# Patient Record
Sex: MALE | Race: WHITE | Employment: FULL TIME | URBAN - METROPOLITAN AREA
[De-identification: names, ages, dates, MRNs, and addresses within clinical notes are randomized per-mention and may not be internally consistent; named-entity substitution may affect disease eponyms.]

---

## 2017-01-03 ENCOUNTER — TELEPHONE (OUTPATIENT)
Dept: ORTHOPEDIC SURGERY | Age: 37
End: 2017-01-03

## 2020-02-19 ENCOUNTER — OFFICE VISIT (OUTPATIENT)
Dept: ORTHOPEDIC SURGERY | Age: 40
End: 2020-02-19
Payer: COMMERCIAL

## 2020-02-19 VITALS
SYSTOLIC BLOOD PRESSURE: 139 MMHG | HEART RATE: 70 BPM | DIASTOLIC BLOOD PRESSURE: 89 MMHG | HEIGHT: 70 IN | BODY MASS INDEX: 24.05 KG/M2 | WEIGHT: 167.99 LBS

## 2020-02-19 PROCEDURE — G8484 FLU IMMUNIZE NO ADMIN: HCPCS | Performed by: FAMILY MEDICINE

## 2020-02-19 PROCEDURE — 99203 OFFICE O/P NEW LOW 30 MIN: CPT | Performed by: FAMILY MEDICINE

## 2020-02-19 PROCEDURE — G8427 DOCREV CUR MEDS BY ELIG CLIN: HCPCS | Performed by: FAMILY MEDICINE

## 2020-02-19 PROCEDURE — G8420 CALC BMI NORM PARAMETERS: HCPCS | Performed by: FAMILY MEDICINE

## 2020-02-19 PROCEDURE — 1036F TOBACCO NON-USER: CPT | Performed by: FAMILY MEDICINE

## 2020-02-19 RX ORDER — METHYLPREDNISOLONE 4 MG/1
TABLET ORAL
Qty: 21 KIT | Refills: 0 | Status: SHIPPED | OUTPATIENT
Start: 2020-02-19

## 2020-02-19 RX ORDER — CELECOXIB 200 MG/1
200 CAPSULE ORAL DAILY
Qty: 30 CAPSULE | Refills: 3 | Status: SHIPPED | OUTPATIENT
Start: 2020-02-19

## 2020-02-19 NOTE — PROGRESS NOTES
Chief Complaint  Foot Pain (OPNP RIGHT FOOT)      Stone Herrera is a 37 y.o. 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 Jered Johnson who is being seen today upon self-referral for evaluation of ongoing pain to his right forefoot after running a 5K on July 4, 2019. History of Present Illness for New Patient:     Patient is being seen today for chronic right foot pain. The patient reports on 7/4/2019 after running a 5K race he began noticed the onset of pain to primarily the medial and central plantar portion of his forefoot. He does not recall a specific history of actual injury no activity prior to becoming symptomatic noted With swelling numbness or tingling. He did undergo some relative rest and actually in October 2019 did get a pair of custom orthotics fashioned which he did have adjusted only 1 or 2 times but is still causing pain into his forefoot is a try to incorporate a rather sizable plantar metatarsal bar it looks like on a picture but the orthotics are not available for review. He does utilize this with running but they are too uncomfortable for him to wear around the house and he has been using a self modified off-the-shelf insert. He does have minimal discomfort at rest but if he tries to walk barefoot his pain can be a 7-8 out of 10 to the forefoot. He is localized the majority of his pain at this point to the plantar surface of the first and second MTP joints. He has not noticed swelling or bruising. Ranjeet Higgins He describes the symptoms as aching and sharp. The pain is located plantar forefoot more medially and the patient rates their current pain as a 8 out of 10 on the pain scale. This problem has been an issue for 7 months. The problem is worse in the afternoon, in the evening and After running and walking barefoot or is constant or is associated with substantial stiffness and weakness.  Patient has other associated symptoms: Icing and relative rest. Patient has attempted rest, ice, heat, chiropractic therapy/manipulation, inserts over the last several months to treat this problem and symptoms are show no change. Patient does not have a previous history foot pain although he is an over pronator practically on the right. Patient is being seen today for orthopedic and sports consultation and review of imaging. I have reviewed and attest the documentation of the HPI documented by my . I will make any changes if necessary. Enc Date: 2020  Time: 10:37 AM  Provider: Mylene Rodney MD        Review of Systems  Pertinent items are noted in HPI  Review of systems reviewed from Patient History Form dated on 2020 and available in the patient's chart under the Media tab. Vital Signs     There were no vitals taken for this visit. Past Medical History   has no past medical history on file. Past Surgical History   has a past surgical history that includes Nasal septum surgery and Nose surgery (Bilateral, 2013). Social History     Tobacco Use    Smoking status: Former Smoker     Last attempt to quit: 2010     Years since quittin.2   Substance Use Topics    Alcohol use: Yes     Alcohol/week: 3.0 standard drinks     Types: 3 Cans of beer per week    Drug use: Not on file     Comment: no        Current Outpatient Medications   Medication Sig Dispense Refill    Pediatric Multivitamins-Fl (MULTI VIT/FL PO) Take 500 mg by mouth      Omega-3 Fatty Acids (FISH OIL) 1000 MG CAPS Take 3,000 mg by mouth 3 times daily      ketoconazole (NIZORAL) 2 % cream Apply 2 % topically daily Apply topically daily.  nystatin (MYCOSTATIN) 392583 UNIT/GM cream Apply 30 mg topically 2 times daily Apply topically 2 times daily.  meloxicam (MOBIC) 15 MG tablet TK 1 T PO QD  3     No current facility-administered medications for this visit.         General Exam:   Constitutional: Patient is adequately groomed with no evidence of malnutrition  DTRs: Deep tendon reflexes are intact  Mental Status: The patient is oriented to time, place and person. The patient's mood and affect are appropriate. Lymphatic: The lymphatic examination bilaterally reveals all areas to be without enlargement or induration. Vascular: Examination reveals no swelling or calf tenderness. Peripheral pulses are palpable and 2+. Neurological: The patient has good coordination. There is no weakness or sensory deficit. Right foot examination    Inspection: No high-grade deformity or soft tissue swelling. No palpable masses. Palpation: He does seem to have some tenderness over the plantar aspect of the first and second and to lesser degree third MTP joints. I do not feel masses between the metatarsal heads to suggest neuroma. He is tender over the fibular greater than tibial sesamoid. Rang of Motion: Tightness to Achilles and stiffness to the forefoot. Strength: Strength foot and ankle appears to be intact. Special Tests: There is no evidence of instability. Pain with first MTP grind and palpation of the metatarsals as well as sesamoids as listed above. Skin: There are no rashes, ulcerations or lesions. Distal motor sensory and vascular exam is intact. Gait: Fluid smooth gait       Reflex symmetrically preserved      Additional Comments:             Additional Examinations:     Contralateral Exam: Contralateral left foot and ankle exam appears unremarkable. Right Lower Extremity: Examination of the right lower extremity does not show any tenderness, deformity or injury. Range of motion is unremarkable. There is no gross instability. There are no rashes, ulcerations or lesions. Strength and tone are normal.  Left Lower Extremity: Examination of the left lower extremity does not show any tenderness, deformity or injury. Range of motion is unremarkable. There is no gross instability.   There are no rashes, ulcerations or lesions. Strength and tone are normal.        Diagnostic Test Findings: Xr Foot Right (min 3 Views)    Result Date: 2/19/2020  Radiology exam is complete. No Radiologist dictation. Please follow up with ordering provider. Right foot AP lateral oblique films were obtained today and show very minimal very early degenerative change at the first MTP joint with slight hallux valgus. No obvious signs of stress injury. Assessment & Plan:    Encounter Diagnosis   Name Primary?  Right foot pain Yes       Orders Placed This Encounter   Procedures    XR FOOT RIGHT (MIN 3 VIEWS)     Standing Status:   Future     Number of Occurrences:   1     Standing Expiration Date:   2/19/2021         Treatment Plan:  Treatment options were discussed withGerry Carson. We did review his plain films and exam findings. He is a recreational runner running 5 days/week and has continued to run since the onset of his symptoms last July 2019. Suspect we are dealing with metatarsalgia as well as some superimposed sesamoiditis and he is tight with regard to his Achilles. We discussed further work-up with imaging however he has had little in the way of dedicated treatment and therefore we did place him on a Medrol Dosepak to be followed by Celebrex 200 mg daily. He does have a substantial history of reflux and is unable to tolerate Naprosyn ibuprofen and even tried meloxicam in the past with worsening reflux. We did recommend that he returns to his orthotist that n.p.o. in [place] for readjustment of his custom orthotics. His tarsal bar may not be adequately unloading his forefoot and need to be adjusted. I do think he would benefit from supervised therapy as well but more importantly to undergo a formal running analysis as he is also been developing some left-sided mechanical back pain symptoms.   We will have him see Cheron Falling for his back which is also been problematic for the past 6 to 7 months. It sounds as if he may have some very episodic pain into his left leg in the L5 distribution. We will see him back specifically for his foot in about 3 to 4 weeks and hopefully can get his orthotics adjusted in the meantime. He will contact us in the interim with questions or concerns. This dictation was performed with a verbal recognition program (DRAGON) and it was checked for errors. It is possible that there are still dictated errors within this office note. If so, please bring any errors to my attention for an addendum. All efforts were made to ensure that this office note is accurate.

## 2020-02-25 ENCOUNTER — HOSPITAL ENCOUNTER (OUTPATIENT)
Dept: PHYSICAL THERAPY | Age: 40
Setting detail: THERAPIES SERIES
Discharge: HOME | End: 2020-02-25
Payer: COMMERCIAL

## 2020-02-25 PROCEDURE — 97140 MANUAL THERAPY 1/> REGIONS: CPT | Performed by: PHYSICAL THERAPIST

## 2020-02-25 PROCEDURE — 97162 PT EVAL MOD COMPLEX 30 MIN: CPT | Performed by: PHYSICAL THERAPIST

## 2020-02-25 PROCEDURE — 97110 THERAPEUTIC EXERCISES: CPT | Performed by: PHYSICAL THERAPIST

## 2020-02-25 NOTE — PLAN OF CARE
[facility], [address]  Phone: [phone]  Fax [phone]     Physical Therapy Certification    Dear Referring Practitioner: Mary Yu,    We had the pleasure of evaluating the following patient for physical therapy services at [address] [place] [address]. A summary of our findings can be found in the initial assessment below. This includes our plan of care. If you have any questions or concerns regarding these findings, please do not hesitate to contact me at the office phone number checked above. Thank you for the referral.       Physician Signature:_______________________________Date:__________________  By signing above (or electronic signature), therapists plan is approved by physician    Patient: Jacquelyn Escalante   : 1980   MRN: [id number]  Referring Physician: Referring Practitioner: Mary Yu      Evaluation Date: 2020      Medical Diagnosis Information:  Diagnosis: M77.41 (ICD-10-CM) - Metatarsalgia of right foot, M25.80 (ICD-10-CM) - Sesamoiditis, M79.671 (ICD-10-CM) - Right foot pain   Treatment Diagnosis: M79.671 (ICD-10-CM) - Right foot pain                                         Insurance information: PT Insurance Information:  [facility]  - [id number]       Precautions/ Contra-indications: none  Latex Allergy:  [x]NO      []YES  Preferred Language for Healthcare:   [x]English       []other:    SUBJECTIVE: Patient stated complaint: Pt reports R foot pain/ LBP since July of this year. Pt reports running with regularity since . He reports running a race in July, and noted pain the next day. He had soreness in back and foot. Sought chiropractic care and custom inserts. He has been running in [address] [facility] for many years. Pt is currently running still but having to modify mileage and intensity. Pt reports that he has had radiating pain from the back to his foot.  Pt reports that he has difficulty sitting for 5-10 mins, relieved in part with standing/walking. Pt notes pain with ascending>descending stairs. F/u with spine MD Monday and f/u with [facility]. Relevant Medical History:sibro psoriasis  Functional Disability Index: 18% LEFS    Height 5'10\" Weight 160  Pain Scale: 5/10 (LBP), 1/10 in foot pain  Easing factors: heat   Provocative factors: sitting, running     Type: []Constant   [x]Intermittent  [x]Radiating []Localized []other:     Numbness/Tingling: L post glute    Occupation/School:     Living Status/Prior Level of Function: Independent with ADLs and IADLs, running 25 miles per week ([employer] 5k, 1/2 marathon March 22). [employer] training mountain biking. OBJECTIVE:     ROM LEFT RIGHT   HIP Flex -Villa -Villa   HIP Abd     HIP Ext     HIP IR 25 25   HIP ER 30 30   Knee ext     Knee Flex     Ankle PF     Ankle DF +10 +10   Ankle In     Ankle Ev     Strength  LEFT RIGHT   HIP Flexors 4 4+   HIP Abductors 3+    HIP Ext Back dom  4 Back dom  4+   Hip ER 3+ 3+   Knee EXT (quad) 5 5   Knee Flex (HS)     Ankle DF 5 5   Ankle PF     Ankle Inv     Ankle EV            Reflexes/Sensation:    []Dermatomes/Myotomes intact    [x]Reflexes equal and normal bilaterally   []Other:    Joint mobility:    []Normal    []Hypo   []Hyper    Palpation: TTP L PSIS    Functional Mobility/Transfers:  Poor squat depth, improved with ER of feet/ hip,  Single leg squat, poor depth, valgus of knee B with L lat lean; balance <10\" EO R, <10\" EC L    Posture: calc inv. B, PF first ray, collapsed arches both feet    Bandages/Dressings/Incisions:     Gait: (include devices/WB status) early overpronation, decreased push off R>L; Orthopedic Special Tests: L LE long, L pubic bone deep, L ASIS ant innominate, L PSIS inf, deep sulcus, L LE hypo    [x] Patient history, allergies, meds reviewed. Medical chart reviewed. See intake form.      Review Of Systems (ROS):  [x]Performed Review of systems []Excellent   [x]Good    []Fair   []Poor  Physical Therapy Evaluation Complexity Justification  [x] A history of present problem with:  [] no personal factors and/or comorbidities that impact the plan of care;  [x]1-2 personal factors and/or comorbidities that impact the plan of care  []3 personal factors and/or comorbidities that impact the plan of care  [x] An examination of body systems using standardized tests and measures addressing any of the following: body structures and functions (impairments), activity limitations, and/or participation restrictions;:  [] a total of 1-2 or more elements   [] a total of 3 or more elements   [x] a total of 4 or more elements   [x] A clinical presentation with:  [] stable and/or uncomplicated characteristics   [x] evolving clinical presentation with changing characteristics  [] unstable and unpredictable characteristics;   [x] Clinical decision making of [] low, [x] moderate, [] high complexity using standardized patient assessment instrument and/or measurable assessment of functional outcome. [] EVAL (LOW) 88879 (typically 20 minutes face-to-face)  [x] EVAL (MOD) 16443 (typically 30 minutes face-to-face)  [] EVAL (HIGH) 57640 (typically 45 minutes face-to-face)  [] RE-EVAL       PLAN:   Frequency/Duration:  1-2 days per week for 8-10 Weeks:  Interventions:  [x]  Therapeutic exercise including: strength training, ROM, for Lower extremity and core   [x]  NMR activation and proprioception for LE, Glutes and Core   [x]  Manual therapy as indicated for LE, Hip and spine to include: Dry Needling/IASTM, STM, PROM, Gr I-IV mobilizations, manipulation. [x] Modalities as needed that may include: thermal agents, E-stim, Biofeedback, US, iontophoresis as indicated  [x] Patient education on joint protection, postural re-education, activity modification, progression of HEP.     HEP instruction: (see scanned forms)    GOALS:  Patient stated goal: return to running pain free  []

## 2020-02-25 NOTE — FLOWSHEET NOTE
L inflare, L ant innominate 10 mins  Symmetrical pelvis achieved   R on R sacral torsion 4 reps  No pain in PSIS after correction                           NMR re-education (67281)   CUES NEEDED   Ockler stab 10\" 10  HEP                                             Pt education: px, dx, POC, role of PT, biomechanical compensations, gait analysis, cross training, strength training, shoe vs orthotic use       Therapeutic Activity (06042)                                                Therapeutic Exercise and NMR EXR  [x] (33991) Provided verbal/tactile cueing for activities related to strengthening, flexibility, endurance, ROM for improvements in LE, proximal hip, and core control with self care, mobility, lifting, ambulation. [x] (98469) Provided verbal/tactile cueing for activities related to improving balance, coordination, kinesthetic sense, posture, motor skill, proprioception  to assist with LE, proximal hip, and core control in self care, mobility, lifting, ambulation and eccentric single leg control.      NMR and Therapeutic Activities:    [x] (27799 or 04137) Provided verbal/tactile cueing for activities related to improving balance, coordination, kinesthetic sense, posture, motor skill, proprioception and motor activation to allow for proper function of core, proximal hip and LE with self care and ADLs  [x] (54623) Gait Re-education- Provided training and instruction to the patient for proper LE, core and proximal hip recruitment and positioning and eccentric body weight control with ambulation re-education including up and down stairs     Home Exercise Program:    [x] (50927) Reviewed/Progressed HEP activities related to strengthening, flexibility, endurance, ROM of core, proximal hip and LE for functional self-care, mobility, lifting and ambulation/stair navigation   [] (94694)Reviewed/Progressed HEP activities related to improving balance, coordination, kinesthetic sense, posture, motor skill, Patient will demonstrate increased AROM to [facility] to allow for proper joint functioning as indicated by patients Functional Deficits. []? Progressing: []? Met: []? Not Met: []? Adjusted  3. Patient will demonstrate an increase in Strength to good proximal hip strength and control, within 5lb HHD in LE to allow for proper functional mobility as indicated by patients Functional Deficits. []? Progressing: []? Met: []? Not Met: []? Adjusted  4. Patient will return to all functional activities without increased symptoms or restriction. []? Progressing: []? Met: []? Not Met: []? Adjusted  5. Pt will be able to sit, stand, walk and ascend/descend stairs without sx's or limitations. (patient specific functional goal)    []? Progressing: []? Met: []? Not Met: []? Adjusted       Progression Towards Functional goals:  [] Patient is progressing as expected towards functional goals listed. [] Progression is slowed due to complexities listed. [] Progression has been slowed due to co-morbidities. [x] Plan just implemented, too soon to assess goals progression  [] Other:         Overall Progression Towards Functional goals/ Treatment Progress Update:  [] Patient is progressing as expected towards functional goals listed. [] Progression is slowed due to complexities/Impairments listed. [] Progression has been slowed due to co-morbidities.   [x] Plan just implemented, too soon to assess goals progression <30days   [] Goals require adjustment due to lack of progress  [] Patient is not progressing as expected and requires additional follow up with physician  [] Other    Prognosis for POC: [x] Good [] Fair  [] Poor      Patient requires continued skilled intervention: [x] Yes  [] No    Treatment/Activity Tolerance:  [x] Patient able to complete treatment  [] Patient limited by fatigue  [] Patient limited by pain    [] Patient limited by other medical complications  [] Other:     ASSESSMENT: see eval    Return to Play: (if

## 2020-02-28 ENCOUNTER — HOSPITAL ENCOUNTER (OUTPATIENT)
Dept: PHYSICAL THERAPY | Age: 40
Setting detail: THERAPIES SERIES
Discharge: HOME | End: 2020-02-28
Payer: COMMERCIAL

## 2020-02-28 PROCEDURE — 97140 MANUAL THERAPY 1/> REGIONS: CPT | Performed by: PHYSICAL THERAPIST

## 2020-02-28 PROCEDURE — 97110 THERAPEUTIC EXERCISES: CPT | Performed by: PHYSICAL THERAPIST

## 2020-02-28 PROCEDURE — 97112 NEUROMUSCULAR REEDUCATION: CPT | Performed by: PHYSICAL THERAPIST

## 2020-02-28 NOTE — FLOWSHEET NOTE
proper LE, core and proximal hip recruitment and positioning and eccentric body weight control with ambulation re-education including up and down stairs     Home Exercise Program:    [x] (84952) Reviewed/Progressed HEP activities related to strengthening, flexibility, endurance, ROM of core, proximal hip and LE for functional self-care, mobility, lifting and ambulation/stair navigation   [] (59558)Reviewed/Progressed HEP activities related to improving balance, coordination, kinesthetic sense, posture, motor skill, proprioception of core, proximal hip and LE for self care, mobility, lifting, and ambulation/stair navigation      Manual Treatments:  PROM / STM / Oscillations-Mobs:  G-I, II, III, IV (PA's, Inf., Post.)  [x] (26355) Provided manual therapy to mobilize LE, proximal hip and/or LS spine soft tissue/joints for the purpose of modulating pain, promoting relaxation,  increasing ROM, reducing/eliminating soft tissue swelling/inflammation/restriction, improving soft tissue extensibility and allowing for proper ROM for normal function with self care, mobility, lifting and ambulation. Modalities:     [] GAME READY (VASO)- for significant edema, swelling, pain control. Charges:  Timed Code Treatment Minutes: 50   Total Treatment Minutes: 60     BWC time in/time out:   (and requires time in and out for each CPT code)    [] EVAL (LOW) 21981 (typically 20 minutes face-to-face)  [x] EVAL (MOD) 74871 (typically 30 minutes face-to-face)  [] EVAL (HIGH) 24088 (typically 45 minutes face-to-face)  [] RE-EVAL     [x] AZ(67005) x   1  [] IONTO  [x] NMR (30244) x  1   [] VASO  [x] Manual (10621) x 1    [] Other:  [] TA x      [] Mech Traction (12351)  [] ES(attended) (02426)      [] ES (un) (99687):       GOALS:  Patient stated goal: return to running pain free  []? Progressing: []? Met: []? Not Met: []? Adjusted     Therapist goals for Patient:   Short Term Goals: To be achieved in: 2 weeks  1.  Independent in HEP and progression per patient tolerance, in order to prevent re-injury. []? Progressing: []? Met: []? Not Met: []? Adjusted  2. Patient will have a decrease in pain to facilitate improvement in movement, function, and ADLs as indicated by Functional Deficits. []? Progressing: []? Met: []? Not Met: []? Adjusted     Long Term Goals: To be achieved in: 10 weeks  1. Disability index score of 9% or less for the LEFS to assist with reaching prior level of function. []? Progressing: []? Met: []? Not Met: []? Adjusted  2. Patient will demonstrate increased AROM to [facility] to allow for proper joint functioning as indicated by patients Functional Deficits. []? Progressing: []? Met: []? Not Met: []? Adjusted  3. Patient will demonstrate an increase in Strength to good proximal hip strength and control, within 5lb HHD in LE to allow for proper functional mobility as indicated by patients Functional Deficits. []? Progressing: []? Met: []? Not Met: []? Adjusted  4. Patient will return to all functional activities without increased symptoms or restriction. []? Progressing: []? Met: []? Not Met: []? Adjusted  5. Pt will be able to sit, stand, walk and ascend/descend stairs without sx's or limitations. (patient specific functional goal)    []? Progressing: []? Met: []? Not Met: []? Adjusted       Progression Towards Functional goals:  [] Patient is progressing as expected towards functional goals listed. [] Progression is slowed due to complexities listed. [] Progression has been slowed due to co-morbidities. [x] Plan just implemented, too soon to assess goals progression  [] Other:         Overall Progression Towards Functional goals/ Treatment Progress Update:  [] Patient is progressing as expected towards functional goals listed. [] Progression is slowed due to complexities/Impairments listed. [] Progression has been slowed due to co-morbidities.   [x] Plan just implemented, too soon to assess goals progression <30days

## 2020-03-02 ENCOUNTER — HOSPITAL ENCOUNTER (OUTPATIENT)
Dept: PHYSICAL THERAPY | Age: 40
Setting detail: THERAPIES SERIES
Discharge: HOME | End: 2020-03-02
Payer: COMMERCIAL

## 2020-03-02 PROCEDURE — 97112 NEUROMUSCULAR REEDUCATION: CPT | Performed by: PHYSICAL THERAPIST

## 2020-03-02 PROCEDURE — 97140 MANUAL THERAPY 1/> REGIONS: CPT | Performed by: PHYSICAL THERAPIST

## 2020-03-02 PROCEDURE — 97110 THERAPEUTIC EXERCISES: CPT | Performed by: PHYSICAL THERAPIST

## 2020-03-02 NOTE — FLOWSHEET NOTE
[facility],  [address] [place]  Phone: [phone]  Fax [phone]    Physical Therapy Treatment Note/ Progress Report:           Date:  3/2/2020    Patient Name:  Mary Gray    :  1980  MRN: [id number]  Restrictions/Precautions:    Medical/Treatment Diagnosis Information:  · Diagnosis: M77.41 (ICD-10-CM) - Metatarsalgia of right foot, M25.80 (ICD-10-CM) - Sesamoiditis, M79.671 (ICD-10-CM) - Right foot pain  · Treatment Diagnosis: M79.671 (ICD-10-CM) - Right foot pain; M53.3 SI joint pain  Insurance/Certification information:  PT Insurance Information:  [facility]  - [id number]  Physician Information:  Referring Practitioner: Paula Garcia  Has the plan of care been signed (Y/N):        []  Yes  [x]  No     Date of Patient follow up with Physician: 3-2 Quincy Higgins)      Is this a Progress Report:     []  Yes  [x]  No        If Yes:  Date Range for reporting period:  Sgarhjsir3-  Ending 3-    Progress report will be due (10 Rx or 30 days whichever is less): see above       Recertification will be due (POC Duration  / 90 days whichever is less): 10 days from IE ()       Visit # Insurance Allowable Requires auth   3 60    []no        []yes:     Functional Scale: 18% LEFS (66/80)     Date assessed:  2020      Therapy Diagnosis/Practice Pattern: L SI pain/ R foot pain; C      Number of Comorbidities:  []0     [x]1-2    []3+    Latex Allergy:  [x]NO      []YES  Preferred Language for Healthcare:   [x]English       []other:      Pain level:  2/10 in LB, 2/10 in foot      SUBJECTIVE: Notes significantly last back pain then IE, and still notes levels are decreasing. More sore, less pain. More soreness in the foot, but he still notes focal pressure in the great toe. OBJECTIVE:    Observation:    Test measurements:  Pelvic alignment symmetrical; L PSIS elevated, more symmetrical in ext.  R on R sacral torsion present but improved comparatively     RESTRICTIONS/PRECAUTIONS: runner (1/2 marathon March)    Exercises/Interventions:     Therapeutic Ex (20327) Sets/sec Reps Notes/CUES   Bike  5 mins                 HF/ gastroc  + soleus mob at wall H5 10 ea +hep   Towel pick ups  20 reps +hep   Doming  +SLS H5 15 +hep                                 Manual Intervention (40147)       Symmetrical pelvis achieved    No pain in PSIS after correction             HG Pro to PF  Intrinsic (s); great toe mob, calc mob, TC joint mob 10 mins           NMR re-education (67425)   CUES NEEDED   Ockler stab 10\" 10  HEP   Prone TA+ glute set 10\" 15 reps After MET correction   Supine TA with ADD H5 15 reps With stabilizer  40-60 mmHG   TA + SLR  10 reps ea With stabilizer  40-60 mmHG                           Pt education: px, dx, POC, role of PT, biomechanical compensations, gait analysis, cross training, strength training, shoe vs orthotic use       Therapeutic Activity (21593)                                                Therapeutic Exercise and NMR EXR  [x] (95536) Provided verbal/tactile cueing for activities related to strengthening, flexibility, endurance, ROM for improvements in LE, proximal hip, and core control with self care, mobility, lifting, ambulation. [x] (97972) Provided verbal/tactile cueing for activities related to improving balance, coordination, kinesthetic sense, posture, motor skill, proprioception  to assist with LE, proximal hip, and core control in self care, mobility, lifting, ambulation and eccentric single leg control.      NMR and Therapeutic Activities:    [x] (54105 or 81174) Provided verbal/tactile cueing for activities related to improving balance, coordination, kinesthetic sense, posture, motor skill, proprioception and motor activation to allow for proper function of core, proximal hip and LE with self care and ADLs  [x] (90864) Gait Re-education- Provided training and instruction to

## 2020-03-06 ENCOUNTER — HOSPITAL ENCOUNTER (OUTPATIENT)
Dept: PHYSICAL THERAPY | Age: 40
Setting detail: THERAPIES SERIES
Discharge: HOME | End: 2020-03-06
Payer: COMMERCIAL

## 2020-03-06 PROCEDURE — 97750 PHYSICAL PERFORMANCE TEST: CPT | Performed by: PHYSICAL THERAPIST

## 2020-03-06 PROCEDURE — 97112 NEUROMUSCULAR REEDUCATION: CPT | Performed by: PHYSICAL THERAPIST

## 2020-03-06 PROCEDURE — 97110 THERAPEUTIC EXERCISES: CPT | Performed by: PHYSICAL THERAPIST

## 2020-03-06 NOTE — OP NOTE
[facility], [address], [address] [place] [address]    Physical Performance Test Report     Type of assessment:   [x] running  [] walking    Subjective History:  Reason for Gait Analysis:   Current Injuries: L>R sided LB/SI pain, R foot pain      Pain:    [x] better   [] worse   [] same   [] beginning   [] end   [] other:     Training Details:  Pace: variable  Miles per week: variable  Training Schedule:   Cross training: none  Terrain: road  Upcoming Events: Half marathon 3-22  Footwear: [employer] Ghost; other shoe with custom orthotic revised x 3      Functional Screen Normal Abnormal Comments   Toe Walking able     Heel Walking able     DL Squat  Poor depth, slight improved heels raised/ ER of hips/feet    SL Squat  Valgus noted B  L lat lean    Lunge      Plank Endurance      Multifidi Endurance      Single Leg Stance - EO 10\" on L  <10\"on R     Single Leg Stance - EC <10\" B         Gait Analysis  TM speed: 7:30 pace  Footwear/Orthotics: [employer] Ghost (no orthotic)  Madelin: 167-171 spm  Warm-Up Time Prior to Video: 1 mi    Posterior View Left Right Comments   Trunk      Arm Swing Rotation noted Rotation noted    Rotation present     Side Bending Present with weight acceptance     Shoulder Height  Elevated     Vertical Displacement unremarkable     Pelvis      Drop 11-12   8-9 Walking 3-5 deg  Running 5-7 deg   Knee      Valgus/Varus Slight varus Slight varus    Rotation      Circumduction Occasional  occasional    Foot Left Right Comments   Sagittal plane foot position  +2 +4 Norm 2-3 deg supinated   STJ Proation IC to MS 12 9    STJ pronation @ MS      STJ pronation at TO 7 +2 Poor resupination on R foot   Heel Whip          Lateral View Normal Abnormal Comments   Trunk      Forward Lean WNL     Flexion/Ext      Elbow Angle Midline cross Midline cross Could drop arms to hips more  Decrease rotation, shereen L side   Hip Left Right Comments   Ext at TO >10 >10    Knee      Flexion at IC 14 13    Flexion at MS 28 27    Foot/Ankle      Strike Type Heel strike     Strike Location Overstride Overstride    DF at [employer] restricted restricted    Early Toe Off Present  Present         Assessment:   1. High end pronation on L with lack of resupination on both feet (R >L)  2. Hip drop (L>R) with signs of compensated and uncompensated Trendelenburg   3. Over-rotation with L LE stance leading to crossed arm swing and shoulder height discrepancy. 4. Early toe off present on both feet  5. Over-striding bilaterally d/t slow valerie     Plan:   1. Possible posting of forefoot for resupination of R foot especially with no correction to rear foot; continue to use Ghost shoe  2. Increase glute med strength especially on the L LE to improve stability in the frontal plane. 3. Increase awareness of neutral spine to eliminate over rotation with improved arm swing  4. Increase calf flexibility  5. Valerie drills/ skips/ marching to improve foot contact point under body for better control. Electronically signed by:  Jonathan Frankel PT     Charges:  Timed Code Treatment Minutes: 60   Total Treatment Minutes: 60        [x]? FN(56113) x   1                  []? IONTO  [x]? NMR (57837) x  1               []? VASO  [x]? Manual (52115) x                         [x]? Other: Phys Performance test (x2)  []? TA x                                    []? [facility] Traction (71814)  []?  ES(attended) (08463)                    []? ES (un) (54926):

## 2020-03-09 ENCOUNTER — APPOINTMENT (OUTPATIENT)
Dept: PHYSICAL THERAPY | Age: 40
End: 2020-03-09
Payer: COMMERCIAL

## 2020-03-17 ENCOUNTER — HOSPITAL ENCOUNTER (OUTPATIENT)
Dept: PHYSICAL THERAPY | Age: 40
Setting detail: THERAPIES SERIES
End: 2020-03-17
Payer: COMMERCIAL